# Patient Record
Sex: MALE | Race: WHITE | Employment: FULL TIME | URBAN - METROPOLITAN AREA
[De-identification: names, ages, dates, MRNs, and addresses within clinical notes are randomized per-mention and may not be internally consistent; named-entity substitution may affect disease eponyms.]

---

## 2024-07-27 ENCOUNTER — OFFICE VISIT (OUTPATIENT)
Dept: URGENT CARE | Age: 44
End: 2024-07-27

## 2024-07-27 VITALS
SYSTOLIC BLOOD PRESSURE: 118 MMHG | TEMPERATURE: 98.6 F | WEIGHT: 240 LBS | OXYGEN SATURATION: 95 % | HEART RATE: 87 BPM | DIASTOLIC BLOOD PRESSURE: 72 MMHG | HEIGHT: 72 IN | BODY MASS INDEX: 32.51 KG/M2

## 2024-07-27 DIAGNOSIS — H61.22 IMPACTED CERUMEN OF LEFT EAR: Primary | ICD-10-CM

## 2024-07-27 RX ORDER — SULFAMETHOXAZOLE AND TRIMETHOPRIM 800; 160 MG/1; MG/1
1 TABLET ORAL EVERY 12 HOURS
COMMUNITY
Start: 2024-07-23 | End: 2024-08-02

## 2024-07-27 NOTE — PROGRESS NOTES
Blas Carrera (:  1980) is a 43 y.o. male,  here for evaluation of the following chief complaint(s): Cerumen Impaction (Gets ear wax build up, usually iin left ear.  Tried to remove it himself, but was unsuccessful.  Has no hearing in that ear.)    Blas Carrera is a New patient.   I have reviewed the patient's medications; see Medication Reconciliation.    ASSESSMENT/PLAN:  Diagnosis:     ICD-10-CM    1. Impacted cerumen of left ear  H61.22             Patient was seen at [facility]gent [facility] today for muffled hearing and cerumen impaction in the left ear. On exam cerumen impaction was noted in the left. Remainder of initial ear exam was unremarkable.     Cerumen impaction was able to be removed with ear currette and irrigation without complication. TM was then able to be fully visualized and appeared normal.   Pt advised against use of Q-tip in the future. Debrox as needed.     Patient was discharged home with follow-up and return precautions.       Pt did have elevated BP reading. Therefore, BP was rechecked before discharge. BP decreased below 120/80. He does have history of HTN. No indication for referral to PCP at this time.     Results:  No results found for this visit on 24.      SUBJECTIVE/OBJECTIVE:  HPI    This is a 43 y.o. male that presents today with complaint of: cerumen impaction.     Pt presents today for muffled hearing, cerumen impaction, and ear fullness in the left ear. he states symptoms began 2 days ago. he reports No ear pain. No bleeding. No discharge. denies fever.     Treatment at home prior to arrival included: Debrox  he has had previous cerumen impactions.       Vitals:    24 1618 24 1707   BP: 131/75 118/72   Site: Right Upper Arm    Position: Sitting    Cuff Size: Medium Adult    Pulse: 87    Temp: 98.6 °F (37 °C)    TempSrc: Oral    SpO2: 95%    Weight: 108.9 kg (240 lb)    Height: 1.829 m (6')          Physical Exam  Constitutional:       Appearance: Normal